# Patient Record
Sex: FEMALE | Race: OTHER | HISPANIC OR LATINO | URBAN - METROPOLITAN AREA
[De-identification: names, ages, dates, MRNs, and addresses within clinical notes are randomized per-mention and may not be internally consistent; named-entity substitution may affect disease eponyms.]

---
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
## 2023-06-02 NOTE — ED PROVIDER NOTE - DISCHARGE DATE
----- Message from Doris Gordon sent at 6/1/2023  2:11 PM CDT -----  Regarding: reschedule  Contact: patient  Patient would like to reschedule her 6/19 colonoscopy to 6/16, please call her back at [phone]. Thanks     31-Oct-2021

## 2025-02-12 NOTE — ED ADULT TRIAGE NOTE - NS ED NURSE BANDS TYPE
D: Follow up after seen in [facility]vad clinic, Lvad speed decreased and torsemide/kcl decreased.     I/A: Wts 168-170 since clinic, maps high 80s-low 90s. No diuril needed. No further alarms.           P:  Will discuss with Irais BLAKE.  Patient, Family notified to page on-call coordinator if symptoms worsen or with other concerns. Patient, Family verbalized understanding.     Name band;